# Patient Record
(demographics unavailable — no encounter records)

---

## 2024-10-17 NOTE — PROCEDURE
[IUD Placement] : intrauterine device (IUD) placement [Infection] : infection [Bleeding] : bleeding [Pain] : pain [Expulsion] : expulsion [Failure] : failure [Uterine Perforation] : uterine perforation [Neg Pregnancy Test] : negative pregnancy test [Betadine] : Betadine [Tenaculum] : Tenaculum [Sounded to ___ cm] : sounded to [unfilled] ~Ucm [Mirena IUD] : Mirena IUD [IUD Removal] : intrauterine device (IUD) removal [Time out performed] : Pre-procedure time out performed.  Patient's name, date of birth and procedure confirmed. [Consent Obtained] : Consent obtained [Risks] : risks [Benefits] : benefits [Alternatives] : alternatives [Patient] : patient [IUD Discarded] : IUD discarded [Sent to Pathology] : specimen was placed in buffered formalin and sent for pathology [Tolerated Well] : Patient tolerated the procedure well [No Complications] : no complications [Heavy Vaginal Bleeding] : for heavy vaginal bleeding [Pelvic Pain] : for pelvic pain [Abnormal Uterine Bleeding] : abnormal uterine bleeding [Easy Passage] : Easy passage [Post Placement Transvag. US] : post placement transvaginal ultrasound [Motrin/Ibuprofen] : Motrin/Ibuprofen [de-identified] : [id number]  [de-identified] : 11/2026

## 2024-10-17 NOTE — PROCEDURE
[IUD Placement] : intrauterine device (IUD) placement [Infection] : infection [Bleeding] : bleeding [Pain] : pain [Expulsion] : expulsion [Failure] : failure [Uterine Perforation] : uterine perforation [Neg Pregnancy Test] : negative pregnancy test [Betadine] : Betadine [Tenaculum] : Tenaculum [Sounded to ___ cm] : sounded to [unfilled] ~Ucm [Mirena IUD] : Mirena IUD [IUD Removal] : intrauterine device (IUD) removal [Time out performed] : Pre-procedure time out performed.  Patient's name, date of birth and procedure confirmed. [Consent Obtained] : Consent obtained [Risks] : risks [Benefits] : benefits [Alternatives] : alternatives [Patient] : patient [IUD Discarded] : IUD discarded [Sent to Pathology] : specimen was placed in buffered formalin and sent for pathology [Tolerated Well] : Patient tolerated the procedure well [No Complications] : no complications [Heavy Vaginal Bleeding] : for heavy vaginal bleeding [Pelvic Pain] : for pelvic pain [Abnormal Uterine Bleeding] : abnormal uterine bleeding [Easy Passage] : Easy passage [Post Placement Transvag. US] : post placement transvaginal ultrasound [Motrin/Ibuprofen] : Motrin/Ibuprofen [de-identified] : [id number]  [de-identified] : 11/2026

## 2024-10-17 NOTE — SIGNATURES
[TextEntry] : This note was written by Lea Moreno on 10/15/2024 actively solely BEATA Fonseca M.D 10/15/2024 . All medical record entries made by this scribe were at my  BEATA Fonseca M.D  direction and personally dictated by me on 10/15/2024  . I have personally reviewed my chart and agree that the record reflects my personal performance of the history, physical exam, assessment, and plan.

## 2024-10-17 NOTE — PLAN
[FreeTextEntry1] : 						 - Mirena IUD replaced -Tolerated well -Referral for TVUS given  -Rx for Metronidazole sent for h/o.vaginal discharge and odor -Vaginitis panel done today -RTO prn

## 2025-03-11 NOTE — PHYSICAL EXAM
Return to the ER immediately if patient begins to have any vomiting, sudden onset of severe headache that is constant.  Confusion or for any other concerns.   [Chaperone Present] : A chaperone was present in the examining room during all aspects of the physical examination [Labia Majora] : normal [Labia Minora] : normal [Normal] : normal [Moderate] : There was moderate vaginal bleeding [Enlarged ___ wks] : enlarged [unfilled] ~Uweeks [FreeTextEntry2] : Natasha [FreeTextEntry5] : clots at os, unable to see IUD string

## 2025-03-11 NOTE — PHYSICAL EXAM
[Chaperone Present] : A chaperone was present in the examining room during all aspects of the physical examination [Labia Majora] : normal [Labia Minora] : normal [Normal] : normal [Moderate] : There was moderate vaginal bleeding [Enlarged ___ wks] : enlarged [unfilled] ~Uweeks [FreeTextEntry2] : Natasha [FreeTextEntry5] : clots at os, unable to see IUD string

## 2025-03-11 NOTE — HISTORY OF PRESENT ILLNESS
[FreeTextEntry1] : This 36 yo , presents, after visit to ER 3 days ago, c/o heavy vaginal bleeding with clots; pt reports since Mirena IUD insert back on Oct 15th, she has bled irregularly, but the bleeding she experienced 3 days ago was heavy; not taking any PO meds for her vaginal bleeding, and she is at a point now that she will agree to surgery due to her fibroid uterus.  Has not been SA over the last year.

## 2025-03-17 NOTE — REASON FOR VISIT
[Follow-Up] : a follow-up evaluation of [Abnormal Uterine Bleeding] : abnormal uterine bleeding [Home] : at home, [unfilled] , at the time of the visit. [Medical Office: ([facility])___] : at the medical office located in  [Telehealth (audio & video)] : This visit was provided via telehealth using real-time 2-way audio visual technology. [Verbal consent obtained from patient] : the patient, [unfilled]

## 2025-03-17 NOTE — HISTORY OF PRESENT ILLNESS
[FreeTextEntry1] : 34yo P0 with known history of symptomatic fibroids, presents for TEB to discuss continued heavy vaginal bleeding and pelvic pain despite Mirena IUD being in situ and prior attempt at hysteroscopic resection of intramural myoma.  Pt presented to ED with very heavy bleeding and fatigue and pain 10 days ago.  Hb at that time was 8.8.  She reports pain is "bulgy" and stabbing.  At times pain is 10/10.  Takes Tylenol. She has had bleeding almost daily since December.  Her symptoms have impacted work and her daily functioning. Pelvic sono on 3/7 revealed: Enlarged fibroid uterus. A central fibroid, measuring 6.3 cm is likely submucosal in location. Multiple additional fibroids are seen. Limited visualization of the endometrial canal with an IUD. A suspected polyp, noted on the prior study, is not visualized. Both ovaries are normal in appearance.  Pt also c/o yellowish vaginal d/c.  Vaginitis panel revealed BV.

## 2025-03-17 NOTE — PLAN
[FreeTextEntry1] : Symptomatic fibroids with AUB and pelvic pain. Pt with long standing symptoms including severe anemia.  Pt previously very reluctant to perform another abdominal myomectomy.  Discussed minimal options given failed medical treatment and hysteroscopic medication.  Previous multiple blood transfusions and Iron infusions.  Highly indicated abdominal myomectomy.  Pt aware that she will also need removal of large submucosal myoma.  Offered pt to start Myfembree for heavy bleeding especially in preparation of desired and indicated surgery. Rx sent Will order pelvic MRI for fibroid mapping Referral to Dr Dawkins for surgical consult. Symptomatic BV--Rx for Metronidazole sent All questions and concerns were addressed

## 2025-04-14 NOTE — HISTORY OF PRESENT ILLNESS
[FreeTextEntry1] : 36 yo P0 here w/HMB and cramping. Pt w/h/o multiple transfusions ~ 5 w/in that last 12 months. Known fibroids. H/o abd myomectomy. Has Mirena IUD in place now. Pt visibly upset about the effects on her QOL.  Referred by Dr. Chung

## 2025-04-14 NOTE — PHYSICAL EXAM
[Normal] : normal [FreeTextEntry2] : Serenity [FreeTextEntry7] : Pfannenstiel scar. Umb TTP. [FreeTextEntry6] : Ut ~18 wks, bulky, irregular, TTP at umb.

## 2025-04-14 NOTE — DISCUSSION/SUMMARY
[FreeTextEntry1] : 36 yo P0 w/symptomatic ut myomas. Pt w/HMB refractory to medical intervention. Pt w/ h/o multiple transfusions, h/o abd christian. Interested in future fertility. MRI images reviewed w/patient ([facility]). Multiple myomas including SM myoma. Questions answered.  Plan Schedule James repeat christian w/ lsc USG and cell saver (should be first case) Draw CBC today  Note to Dr. Chung

## 2025-06-04 NOTE — HISTORY OF PRESENT ILLNESS
[Other Location: e.g. School (Enter Location, City,State)___] : at [unfilled], at the time of the visit. [Medical Office: ([facility])___] : at the medical office located in  [Telehealth (audio & video)] : This visit was provided via telehealth using real-time 2-way audio visual technology. [FreeTextEntry1] : 37 yo P0 here for preop chat prior to James repeat christian, IUD removal. Had another episode of HMB this weekend. No bleeding anymore. Used TXA in the past, with minimal success. Still w/vag d/c.   Hgb 7.6 4/14/25  Recap 34 yo P0 w/symptomatic ut myomas. Pt w/HMB refractory to medical intervention. Pt w/ h/o multiple transfusions, h/o abd christian. Interested in future fertility. MRI images reviewed w/patient ([facility]). Multiple myomas including SM myoma. Questions answered.  Plan Schedule James repeat christian w/ lsc USG and cell saver (should be first case) Draw CBC today

## 2025-06-04 NOTE — DISCUSSION/SUMMARY
[FreeTextEntry1] : 35 yo P0 here for preop chat prior to gerda repeat christian.  Pt w/HMB --> symptomatic anemia and multiple myomas. Mirena IUD in place. Questions answered.  Plan Pt to f/u w/heme to try to increase blood count, will consider rescheduling surgery if necessary. Pt to keep appt w/Heme. Keep appt for gerda repeat christian w/lsc usc and cell saver.

## 2025-07-03 NOTE — PHYSICAL EXAM
The Service to Behavioral [facility] order in workqueue [id number] requested on 11/5/2020 has been cancelled as, unable to contact. Ordering provider has been notified.  
[FreeTextEntry7] : [employer] c/d/i

## 2025-07-03 NOTE — DISCUSSION/SUMMARY
[FreeTextEntry1] : 37 yo P0 here for PO check after gerda repeat christian, with removal of 17 fibroids 6/20/25. Pt recovering well. Path pending.  Plan F/u Path Note to Dr. Chung

## 2025-07-03 NOTE — HISTORY OF PRESENT ILLNESS
[FreeTextEntry1] : 37 yo P here for PO check after gerda repeat christian 6/20/25. Procedure noteworthy for removal of 17 fibroids. Pt w/discomfort in lower abd and inner left thigh.